# Patient Record
Sex: MALE | Race: BLACK OR AFRICAN AMERICAN | NOT HISPANIC OR LATINO | Employment: FULL TIME | URBAN - METROPOLITAN AREA
[De-identification: names, ages, dates, MRNs, and addresses within clinical notes are randomized per-mention and may not be internally consistent; named-entity substitution may affect disease eponyms.]

---

## 2022-03-15 ENCOUNTER — APPOINTMENT (EMERGENCY)
Dept: CT IMAGING | Facility: HOSPITAL | Age: 47
End: 2022-03-15
Payer: OTHER MISCELLANEOUS

## 2022-03-15 ENCOUNTER — APPOINTMENT (EMERGENCY)
Dept: RADIOLOGY | Facility: HOSPITAL | Age: 47
End: 2022-03-15
Payer: OTHER MISCELLANEOUS

## 2022-03-15 ENCOUNTER — HOSPITAL ENCOUNTER (EMERGENCY)
Facility: HOSPITAL | Age: 47
Discharge: HOME | End: 2022-03-15
Attending: EMERGENCY MEDICINE | Admitting: EMERGENCY MEDICINE
Payer: OTHER MISCELLANEOUS

## 2022-03-15 VITALS
SYSTOLIC BLOOD PRESSURE: 136 MMHG | WEIGHT: 225 LBS | OXYGEN SATURATION: 98 % | HEIGHT: 68 IN | BODY MASS INDEX: 34.1 KG/M2 | RESPIRATION RATE: 20 BRPM | TEMPERATURE: 98.4 F | HEART RATE: 77 BPM | DIASTOLIC BLOOD PRESSURE: 91 MMHG

## 2022-03-15 DIAGNOSIS — M51.26 BULGE OF LUMBAR DISC WITHOUT MYELOPATHY: Primary | ICD-10-CM
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
## 2022-03-15 DIAGNOSIS — M25.561 ACUTE PAIN OF RIGHT KNEE: ICD-10-CM

## 2022-03-15 PROCEDURE — 99284 EMERGENCY DEPT VISIT MOD MDM: CPT

## 2022-03-15 PROCEDURE — 73564 X-RAY EXAM KNEE 4 OR MORE: CPT

## 2022-03-15 PROCEDURE — G1004 CDSM NDSC: HCPCS

## 2022-03-15 PROCEDURE — 99284 EMERGENCY DEPT VISIT MOD MDM: CPT | Performed by: EMERGENCY MEDICINE

## 2022-03-15 PROCEDURE — 72131 CT LUMBAR SPINE W/O DYE: CPT

## 2022-03-15 RX ORDER — OXYCODONE HYDROCHLORIDE 5 MG/1
5 TABLET ORAL ONCE
Status: COMPLETED | OUTPATIENT
Start: 2022-03-15 | End: 2022-03-15

## 2022-03-15 RX ORDER — HYDROCHLOROTHIAZIDE 12.5 MG/1
12.5 TABLET ORAL DAILY
COMMUNITY

## 2022-03-15 RX ORDER — CARVEDILOL 25 MG/1
25 TABLET ORAL 2 TIMES DAILY WITH MEALS
COMMUNITY

## 2022-03-15 RX ORDER — CYCLOBENZAPRINE HCL 10 MG
10 TABLET ORAL ONCE
Status: COMPLETED | OUTPATIENT
Start: 2022-03-15 | End: 2022-03-15

## 2022-03-15 RX ORDER — ATORVASTATIN CALCIUM 40 MG/1
40 TABLET, FILM COATED ORAL DAILY
COMMUNITY

## 2022-03-15 RX ORDER — CYCLOBENZAPRINE HCL 10 MG
10 TABLET ORAL 2 TIMES DAILY PRN
Qty: 20 TABLET | Refills: 0 | Status: SHIPPED | OUTPATIENT
Start: 2022-03-15 | End: 2022-03-20

## 2022-03-15 RX ORDER — LIDOCAINE 50 MG/G
1 PATCH TOPICAL ONCE
Status: DISCONTINUED | OUTPATIENT
Start: 2022-03-15 | End: 2022-03-15 | Stop reason: HOSPADM

## 2022-03-15 RX ADMIN — OXYCODONE HYDROCHLORIDE 5 MG: 5 TABLET ORAL at 10:36

## 2022-03-15 RX ADMIN — LIDOCAINE 1 PATCH: 50 PATCH TOPICAL at 09:45

## 2022-03-15 RX ADMIN — CYCLOBENZAPRINE HYDROCHLORIDE 10 MG: 10 TABLET, FILM COATED ORAL at 09:45

## 2022-03-15 NOTE — Clinical Note
Karina Aggarwal was seen and treated in our emergency department on 3/15/2022  Diagnosis:     Oscar Sullivan  may return to work on return date  He may return on this date: 03/17/2022         If you have any questions or concerns, please don't hesitate to call        Haley Reynolds DO    ______________________________           _______________          _______________  Hospital Representative                              Date                                Time

## 2022-03-15 NOTE — ED NOTES
Patient given discharge instructions by Dr Abhijeet Puckett  Patient waiting in department for ride to pick him up        Aneta Closs, RN  03/15/22 7284

## 2022-03-15 NOTE — DISCHARGE INSTRUCTIONS
Please be careful when taking medication that is being prescribed as it can make people drowsy  If you develop any new or worsening symptoms please immediately return to your nearest emergency department  You have a bulging disc in your lower back that you should follow with your primary care doctor for
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
## 2022-03-15 NOTE — Clinical Note
Juarez Mason was seen and treated in our emergency department on 3/15/2022  Diagnosis:     Marisela Daniels  may return to work on return date  He may return on this date: 03/17/2022         If you have any questions or concerns, please don't hesitate to call        Aditya Torres, DO    ______________________________           _______________          _______________  Hospital Representative                              Date                                Time

## 2022-03-21 ENCOUNTER — TELEPHONE (OUTPATIENT)
Dept: PHYSICAL THERAPY | Facility: OTHER | Age: 47
End: 2022-03-21
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
